# Patient Record
Sex: FEMALE | HISPANIC OR LATINO
[De-identification: names, ages, dates, MRNs, and addresses within clinical notes are randomized per-mention and may not be internally consistent; named-entity substitution may affect disease eponyms.]

---

## 2024-09-20 ENCOUNTER — APPOINTMENT (OUTPATIENT)
Dept: PEDIATRIC ORTHOPEDIC SURGERY | Facility: CLINIC | Age: 29
End: 2024-09-20
Payer: COMMERCIAL

## 2024-09-20 VITALS — HEIGHT: 63 IN | TEMPERATURE: 96.7 F | WEIGHT: 157 LBS | BODY MASS INDEX: 27.82 KG/M2

## 2024-09-20 DIAGNOSIS — S62.652A NONDISPLACED FRACTURE OF MIDDLE PHALANX OF RIGHT MIDDLE FINGER, INITIAL ENCOUNTER FOR CLOSED FRACTURE: ICD-10-CM

## 2024-09-20 PROBLEM — Z00.00 ENCOUNTER FOR PREVENTIVE HEALTH EXAMINATION: Status: ACTIVE | Noted: 2024-09-20

## 2024-09-20 PROCEDURE — 73140 X-RAY EXAM OF FINGER(S): CPT | Mod: 26

## 2024-09-20 PROCEDURE — 99202 OFFICE O/P NEW SF 15 MIN: CPT

## 2024-09-20 NOTE — ASSESSMENT
[FreeTextEntry1] : Impression: Fracture middle phalanx right middle finger.  I advise she discontinue the splint to allow for progressive motion exercises and massage.  She has been made aware is likely another 2-3 weeks before she is feeling significantly better.  She is quite active with 5 children to care for her.  She will return on appearing basis

## 2024-09-20 NOTE — HISTORY OF PRESENT ILLNESS
[de-identified] : This 29-year-old is seen for evaluation of the right middle finger.  She was well until approximately 2 and half weeks ago when she sustained injury to her finger pulling on an object.  This precipitated pain swelling and stiffness she was seen at [facility] and sent out in a splint after x-rays revealed a fracture.  She has continued to use the splint since then.

## 2024-09-20 NOTE — PHYSICAL EXAM
[de-identified] : Exam today with her splint removed mild swelling to the middle finger of the right hand.  She has restricted motion to the PIP joint secondary to immobilization since her day of injury and application of the splint.  She has mild discomfort though no obvious instability on stress.   Review of x-rays [facility] 3 views right third finger September 6, 2024 avulsion fracture volar aspect of the middle phalanx middle finger

## 2025-03-04 ENCOUNTER — APPOINTMENT (OUTPATIENT)
Dept: PEDIATRIC ORTHOPEDIC SURGERY | Facility: CLINIC | Age: 30
End: 2025-03-04
Payer: COMMERCIAL

## 2025-03-04 ENCOUNTER — TRANSCRIPTION ENCOUNTER (OUTPATIENT)
Age: 30
End: 2025-03-04

## 2025-03-04 VITALS — TEMPERATURE: 96.6 F | BODY MASS INDEX: 26.58 KG/M2 | WEIGHT: 150 LBS | HEIGHT: 63 IN

## 2025-03-04 DIAGNOSIS — M23.8X1 OTHER INTERNAL DERANGEMENTS OF RIGHT KNEE: ICD-10-CM

## 2025-03-04 DIAGNOSIS — M23.8X2 OTHER INTERNAL DERANGEMENTS OF LEFT KNEE: ICD-10-CM

## 2025-03-04 PROCEDURE — 73562 X-RAY EXAM OF KNEE 3: CPT | Mod: 50

## 2025-03-04 PROCEDURE — 99213 OFFICE O/P EST LOW 20 MIN: CPT

## 2025-03-04 RX ORDER — DICLOFENAC SODIUM 75 MG/1
75 TABLET, DELAYED RELEASE ORAL TWICE DAILY
Qty: 60 | Refills: 1 | Status: ACTIVE | COMMUNITY
Start: 2025-03-04 | End: 1900-01-01

## 2025-07-07 ENCOUNTER — APPOINTMENT (OUTPATIENT)
Dept: PEDIATRIC ORTHOPEDIC SURGERY | Facility: CLINIC | Age: 30
End: 2025-07-07
Payer: COMMERCIAL

## 2025-07-07 ENCOUNTER — NON-APPOINTMENT (OUTPATIENT)
Age: 30
End: 2025-07-07

## 2025-07-07 VITALS
SYSTOLIC BLOOD PRESSURE: 110 MMHG | BODY MASS INDEX: 27.29 KG/M2 | TEMPERATURE: 97.1 F | HEIGHT: 63 IN | WEIGHT: 154 LBS | DIASTOLIC BLOOD PRESSURE: 75 MMHG

## 2025-07-07 PROCEDURE — 99212 OFFICE O/P EST SF 10 MIN: CPT

## 2025-07-07 PROCEDURE — 73521 X-RAY EXAM HIPS BI 2 VIEWS: CPT

## 2025-07-07 RX ORDER — SULINDAC 200 MG/1
200 TABLET ORAL TWICE DAILY
Qty: 60 | Refills: 1 | Status: ACTIVE | COMMUNITY
Start: 2025-07-07 | End: 1900-01-01